# Patient Record
Sex: MALE | Race: WHITE | NOT HISPANIC OR LATINO | Employment: FULL TIME | URBAN - METROPOLITAN AREA
[De-identification: names, ages, dates, MRNs, and addresses within clinical notes are randomized per-mention and may not be internally consistent; named-entity substitution may affect disease eponyms.]

---

## 2020-07-01 PROBLEM — K42.9 UMBILICAL HERNIA WITHOUT OBSTRUCTION AND WITHOUT GANGRENE: Status: ACTIVE | Noted: 2020-07-01

## 2020-12-08 PROBLEM — E78.2 MIXED HYPERLIPIDEMIA: Status: ACTIVE | Noted: 2020-12-08

## 2020-12-08 PROBLEM — E11.65 TYPE 2 DIABETES MELLITUS WITH HYPERGLYCEMIA, WITHOUT LONG-TERM CURRENT USE OF INSULIN: Status: ACTIVE | Noted: 2020-12-08

## 2021-03-31 ENCOUNTER — IMMUNIZATION (OUTPATIENT)
Dept: PRIMARY CARE CLINIC | Facility: CLINIC | Age: 41
End: 2021-03-31

## 2021-03-31 DIAGNOSIS — Z23 NEED FOR VACCINATION: Primary | ICD-10-CM

## 2021-03-31 PROCEDURE — 91303 PR SARSCOV2 VAC AD26 .5ML IM: CPT | Mod: S$GLB,,, | Performed by: INTERNAL MEDICINE

## 2021-03-31 PROCEDURE — 0031A PR IMMUNIZ ADMIN, SARS-COV-2 COVID-19 VACC, 5X10VP/0.5ML: ICD-10-PCS | Mod: CV19,S$GLB,, | Performed by: INTERNAL MEDICINE

## 2021-03-31 PROCEDURE — 0031A PR IMMUNIZ ADMIN, SARS-COV-2 COVID-19 VACC, 5X10VP/0.5ML: CPT | Mod: CV19,S$GLB,, | Performed by: INTERNAL MEDICINE

## 2021-03-31 PROCEDURE — 91303 PR SARSCOV2 VAC AD26 .5ML IM: ICD-10-PCS | Mod: S$GLB,,, | Performed by: INTERNAL MEDICINE

## 2022-08-22 PROBLEM — J30.89 ENVIRONMENTAL AND SEASONAL ALLERGIES: Status: ACTIVE | Noted: 2022-08-22

## 2023-03-16 PROBLEM — E66.01 MORBID OBESITY: Status: ACTIVE | Noted: 2023-03-16

## 2023-04-20 ENCOUNTER — PATIENT MESSAGE (OUTPATIENT)
Dept: UROLOGY | Facility: CLINIC | Age: 43
End: 2023-04-20
Payer: COMMERCIAL

## 2023-11-08 ENCOUNTER — TELEPHONE (OUTPATIENT)
Dept: OTOLARYNGOLOGY | Facility: CLINIC | Age: 43
End: 2023-11-08
Payer: COMMERCIAL

## 2023-11-08 NOTE — TELEPHONE ENCOUNTER
----- Message from Zac Montes sent at 11/8/2023  3:07 PM CST -----  Contact: self  Type: Sooner Appointment Request        Caller is requesting a sooner appointment. Caller declined first available appointment listed below. Caller will not accept being placed on the waitlist and is requesting a message be sent to doctor.        Name of Caller: Patient   Best Call Back Number: [phone]  Additional Information: Type 2 diabetes mellitus with hyperglycemia, without long-term current use of insulin [E11.65]  Right thyroid nodule. Plz call pt to get scheduled sooner than January. Pt has a referral int he system as well. Thanks

## 2023-11-15 ENCOUNTER — OFFICE VISIT (OUTPATIENT)
Dept: OTOLARYNGOLOGY | Facility: CLINIC | Age: 43
End: 2023-11-15
Payer: COMMERCIAL

## 2023-11-15 VITALS — WEIGHT: 281.06 LBS | BODY MASS INDEX: 40.91 KG/M2

## 2023-11-15 DIAGNOSIS — E04.1 RIGHT THYROID NODULE: Primary | ICD-10-CM

## 2023-11-15 DIAGNOSIS — E11.65 TYPE 2 DIABETES MELLITUS WITH HYPERGLYCEMIA, WITHOUT LONG-TERM CURRENT USE OF INSULIN: ICD-10-CM

## 2023-11-15 PROCEDURE — 1159F MED LIST DOCD IN RCRD: CPT | Mod: CPTII,S$GLB,, | Performed by: OTOLARYNGOLOGY

## 2023-11-15 PROCEDURE — 3008F BODY MASS INDEX DOCD: CPT | Mod: CPTII,S$GLB,, | Performed by: OTOLARYNGOLOGY

## 2023-11-15 PROCEDURE — 3066F NEPHROPATHY DOC TX: CPT | Mod: CPTII,S$GLB,, | Performed by: OTOLARYNGOLOGY

## 2023-11-15 PROCEDURE — 99999 PR PBB SHADOW E&M-EST. PATIENT-LVL V: CPT | Mod: PBBFAC,,, | Performed by: OTOLARYNGOLOGY

## 2023-11-15 PROCEDURE — 3008F PR BODY MASS INDEX (BMI) DOCUMENTED: ICD-10-PCS | Mod: CPTII,S$GLB,, | Performed by: OTOLARYNGOLOGY

## 2023-11-15 PROCEDURE — 1160F RVW MEDS BY RX/DR IN RCRD: CPT | Mod: CPTII,S$GLB,, | Performed by: OTOLARYNGOLOGY

## 2023-11-15 PROCEDURE — 99204 PR OFFICE/OUTPT VISIT, NEW, LEVL IV, 45-59 MIN: ICD-10-PCS | Mod: S$GLB,,, | Performed by: OTOLARYNGOLOGY

## 2023-11-15 PROCEDURE — 1159F PR MEDICATION LIST DOCUMENTED IN MEDICAL RECORD: ICD-10-PCS | Mod: CPTII,S$GLB,, | Performed by: OTOLARYNGOLOGY

## 2023-11-15 PROCEDURE — 3052F PR MOST RECENT HEMOGLOBIN A1C LEVEL 8.0 - < 9.0%: ICD-10-PCS | Mod: CPTII,S$GLB,, | Performed by: OTOLARYNGOLOGY

## 2023-11-15 PROCEDURE — 99204 OFFICE O/P NEW MOD 45 MIN: CPT | Mod: S$GLB,,, | Performed by: OTOLARYNGOLOGY

## 2023-11-15 PROCEDURE — 3061F NEG MICROALBUMINURIA REV: CPT | Mod: CPTII,S$GLB,, | Performed by: OTOLARYNGOLOGY

## 2023-11-15 PROCEDURE — 1160F PR REVIEW ALL MEDS BY PRESCRIBER/CLIN PHARMACIST DOCUMENTED: ICD-10-PCS | Mod: CPTII,S$GLB,, | Performed by: OTOLARYNGOLOGY

## 2023-11-15 PROCEDURE — 99999 PR PBB SHADOW E&M-EST. PATIENT-LVL V: ICD-10-PCS | Mod: PBBFAC,,, | Performed by: OTOLARYNGOLOGY

## 2023-11-15 PROCEDURE — 3052F HG A1C>EQUAL 8.0%<EQUAL 9.0%: CPT | Mod: CPTII,S$GLB,, | Performed by: OTOLARYNGOLOGY

## 2023-11-15 PROCEDURE — 3066F PR DOCUMENTATION OF TREATMENT FOR NEPHROPATHY: ICD-10-PCS | Mod: CPTII,S$GLB,, | Performed by: OTOLARYNGOLOGY

## 2023-11-15 PROCEDURE — 3061F PR NEG MICROALBUMINURIA RESULT DOCUMENTED/REVIEW: ICD-10-PCS | Mod: CPTII,S$GLB,, | Performed by: OTOLARYNGOLOGY

## 2023-11-15 NOTE — PATIENT INSTRUCTIONS
FINE NEEDLE ASPIRATION OF THYROID NODULES  (Information adapted from the [employer] Association, 2019)  www.thyroid.org    WHAT IS THE THYROID GLAND?  The thyroid gland is a butterfly shaped endocrine gland that is normally located in the lower front of the neck.  The thyroid job is to make thyroid hormones, which are secreted into the blood and then carried to every tissue in the body.  Thyroid hormones help the body use energy, stay warm and keep the brain, heart, muscles and other organs working as they should.    WHAT IS A FINE NEEDLE ASPIRATION BIOPSY?  A fine-needle aspiration biopsy is a simple and safe outpatient procedure performed by a physician.  The biopsy is typically performed under ultrasound guidance to ensure accurate placement of the needle within the nodule.  During the procedure, you will be asked to lie down on your back with your head tipped backwards, so that your neck is extended.  A small amount of numbing medicine is placed on the surface of the skin, and a small sample is taken from the nodule in question.  During the procedure you may feel some neck pressure from the ultrasound probe and from the needle.  It is important to remain still during the procedure and avoid coughing, talking, and swallowing.    WHAT SHOULD YOU EXPECT FOLLOWING THE PROCEDURE?  After the procedure, you may be asked to sit up slowly to prevent from getting lightheaded.  Most patients typically leaves feeling well.  There are very few, if any restrictions on what you can do after a thyroid biopsy.  Mild neck discomfort at the site of the biopsy is expected following the procedure. over-the-counter pain medication and ice compresses can be used to relieve discomfort.    HOW LONG DOES NOT TAKE FOR RESULTS TO RETURN?  Biopsy results can take anywhere from a few days to 2 weeks.. Your doctor will reach out with the results when they become available.    WHAT OTHER POSSIBLE RESULTS?  Results of thyroid biopsies are  given as 1 of 6 possible diagnoses, according to the bed status system for reporting thyroid cytopathology.  Please note that the percentage is below may be somewhat different depending on the institution or center.    BENIGN:  This accounts for up to 70% of biopsies using this classification.  The risk of cancer or malignancy in this group is less than 3%.  These nodules are generally monitored with a follow-up ultrasound and if needed, periodically after that.  MALIGNANT (CANCER):  This accounts for 3-7% of all biopsies using this classification.  When a biopsy comes back as malignant, there is a 97-99% chance that it is truly cancerous.  Almost all of these nodules will go to surgery.  SUSPICIOUS FOR MALIGNANCY:  When a biopsy result returns as suspicious for malignancy, there is a 60-75% chance of cancer.  The cytopathologist will see features that are worrisome, but not diagnostic of cancer.  The treatment is typically surgery.  ATYPIA OF UNDETERMINED SIGNIFICANCE (AUS) OR FOLLICULAR LESION OF UNDETERMINED SIGNIFICANCE (FLUS):  Pre this category may alternatively be called indeterminate.  The specimens had some features that are worrisome and some features that look more benign.  This diagnosis carries a 5-15% chance of malignancy, although there is some variability among institutions.  A repeat biopsy, genetic testing, or repeating an ultrasound may be useful in these cases.  FOLLICULAR NEOPLASM OR SUSPICIOUS FOR FOLLICULAR NEOPLASM:  This category alternatively may be called indeterminate as well.  This category caries of 15-30% risk of malignancy.  It is difficult to tell if nodules are benign or malignant on less taken out.  Genetic testing may be considered in these cases.  When surgery, removing half of the thyroid is sometimes an option.  NON-DIAGNOSTIC:  This means that there are not enough cells in the sample to make a diagnosis.  Despite our best efforts and even when we can see the needle was in the  nodule during the biopsy, the specimen and sometimes does not have enough thyroid follicular cells to make a proper diagnosis.  Nondiagnostic sample scan also a car when only cyst fluid is taken out, and for other reasons, such as the presence of too much blood.  In these cases, the biopsy can be repeated, and if nondiagnostic a 2nd time, consideration is given to a 3rd biopsy, monitoring, or surgery.    WHAT GENETIC TESTS ARE USEFUL FOR THYROID BIOPSIES OF NODULES?  Within the past few years, several molecular tests have become available to help determine whether some nodules are cancerous or benign.  These tests look at many genes within the thyroid nodules genetic information.  They are being used when a nodule biopsy comes back with a diagnosis of indeterminate.  Sometimes the person doing the biopsy will perform an additional pass of the needle to obtain material for such a test.  This may be done on the 1st biopsy or at the time of a repeat biopsy.

## 2023-11-15 NOTE — PROGRESS NOTES
Subjective:       Patient ID: Aaron Carpenter is a 43 y.o. male.    Chief Complaint: Thyroid Problem (Nodules )    Aaron is here for Thyroid nodule. Initially diagnosed: incidentally during ultrasound obtained following palpation of an acute submental mass (over thyroid cartilage.) The submental mass has since improved a lot and nothing was seen on ultrasound.  Characteristics of symptoms: no pain, no dysphagia, no odynophagia, no compressive symptoms, no voice changes  no previous biopsy  Previous treatments: none    no personal history of radiation  no family history of thyroid cancer  History of a right branchial cleft cyst around 10 yrs ago - Dr Jain    no history of kidney stones  no history of osteopenia / porosis  no memory issues  no GI issues (nausea / bowel changes)       Social History     Tobacco Use   Smoking Status Former    Current packs/day: 0.00    Types: Cigarettes    Start date: 2001    Quit date: 2016    Years since quittin.8   Smokeless Tobacco Never     Social History     Substance and Sexual Activity   Alcohol Use Yes    Comment: social        Objective:        Constitutional:   He is oriented to person, place, and time. He appears well-developed and well-nourished. He appears alert. He is active. Normal speech.      Head:  Normocephalic and atraumatic. Head is without TMJ tenderness. No scars. Salivary glands normal.  Facial strength is normal.      Ears:    Right Ear: No drainage or swelling. No middle ear effusion.   Left Ear: No drainage or swelling.  No middle ear effusion.     Nose:  No mucosal edema, rhinorrhea or sinus tenderness. No turbinate hypertrophy.      Mouth/Throat  Oropharynx clear and moist without lesions or asymmetry, normal uvula midline and mirror exam normal. Normal dentition. No uvula swelling, lacerations or trismus. No oropharyngeal exudate. Tonsillar erythema, tonsillar exudate.      Neck:  Full range of motion with neck supple and no adenopathy.  Thyroid tenderness is present. No tracheal deviation, no edema, no erythema, normal range of motion, no stridor, no crepitus and no neck rigidity present. No thyroid mass present.   Well healed R high neck incision  No obvious mass in submental / prethyroidal tissue - mildly prominent adipose tissue.  No palpable thyroid nodule     Cardiovascular:    Intact distal pulses and normal pulses.              Pulmonary/Chest:   Effort normal and breath sounds normal. No stridor.     Psychiatric:   His speech is normal and behavior is normal. His mood appears not anxious. His affect is not labile.     Neurological:   He is alert and oriented to person, place, and time. No sensory deficit.     Skin:   No abrasions, lacerations, lesions, or rashes. No abrasion and no bruising noted.         Tests / Results:  I personally reviewed the following imaging studies and my impression is:  11/8/2023 neck ultrasound:   Vague slightly hypoechoic nodule posterior to right pole (one image appears distinct from gland). Borders are regular on some viewed images and a little blurred on others. No internal calcifications. No CINE images    FINDINGS:  The right lobe of the thyroid measures 5.1 x 2.2 x 2.1 cm.  The left lobe measures 3.9 x 1.5 x 1.5 cm.  The thyroid isthmus measures 4 mm in thickness.  There is an isoechoic 2.1 x 1.2 x 1.4 cm nodule in the posterior aspect of the right lobe of the thyroid gland.  There is no concerning artifact.  There is no abnormal vascularity.     Evaluation of the submental soft tissues corresponding to the palpable abnormality demonstrates normal subcutaneous fat without nodule or mass.     Impression:     1. No finding corresponding to the palpable abnormality in the submental region.  CT may be beneficial.  2. Right thyroid nodule as detailed above.  This meets criteria for percutaneous sampling.  TI-RADS category     TSH: normal  Ca 8.7 - 9.4    Assessment:       1. Right thyroid nodule    2. Type 2  diabetes mellitus with hyperglycemia, without long-term current use of insulin          Plan:         We discussed thyroid nodule  Right nodule (possible exophytic nodule) meets criteria for FNA  Will call with results

## 2023-11-16 ENCOUNTER — TELEPHONE (OUTPATIENT)
Dept: ENDOCRINOLOGY | Facility: CLINIC | Age: 43
End: 2023-11-16
Payer: COMMERCIAL

## 2023-11-16 NOTE — TELEPHONE ENCOUNTER
Called pt upon receiving referral for thyroid nodule. Pt said they would call back when they are ready to schedule.

## 2023-11-17 ENCOUNTER — TELEPHONE (OUTPATIENT)
Dept: RADIOLOGY | Facility: HOSPITAL | Age: 43
End: 2023-11-17
Payer: COMMERCIAL

## 2023-11-22 ENCOUNTER — PATIENT MESSAGE (OUTPATIENT)
Dept: RADIOLOGY | Facility: HOSPITAL | Age: 43
End: 2023-11-22
Payer: COMMERCIAL

## 2023-11-22 ENCOUNTER — TELEPHONE (OUTPATIENT)
Dept: RADIOLOGY | Facility: HOSPITAL | Age: 43
End: 2023-11-22
Payer: COMMERCIAL

## 2023-11-22 NOTE — TELEPHONE ENCOUNTER
Pt called/ voicemail left 11/17 and 11/20/23 to schedule thyroid fna. Pt messaged thru portal 11/22/23.

## 2023-12-20 ENCOUNTER — TELEPHONE (OUTPATIENT)
Dept: OTOLARYNGOLOGY | Facility: CLINIC | Age: 43
End: 2023-12-20
Payer: COMMERCIAL

## 2023-12-20 NOTE — TELEPHONE ENCOUNTER
Multiple attempts to schedule patient for FNA.  No return call from patient please advise. I spoke with Marnie the wife an she would like to be called to schedule.  Radiology department notified

## 2024-01-11 ENCOUNTER — TELEPHONE (OUTPATIENT)
Dept: OTOLARYNGOLOGY | Facility: CLINIC | Age: 44
End: 2024-01-11
Payer: COMMERCIAL

## 2024-01-18 ENCOUNTER — HOSPITAL ENCOUNTER (OUTPATIENT)
Dept: RADIOLOGY | Facility: HOSPITAL | Age: 44
Discharge: HOME | End: 2024-01-18
Attending: OTOLARYNGOLOGY
Payer: COMMERCIAL

## 2024-01-18 DIAGNOSIS — E04.1 RIGHT THYROID NODULE: ICD-10-CM

## 2024-01-18 PROCEDURE — 88173 CYTOPATH EVAL FNA REPORT: CPT | Mod: 26,,, | Performed by: PATHOLOGY

## 2024-01-18 PROCEDURE — 10005 FNA BX W/US GDN 1ST LES: CPT | Mod: PO

## 2024-01-18 PROCEDURE — 88173 CYTOPATH EVAL FNA REPORT: CPT | Mod: PO | Performed by: PATHOLOGY

## 2024-01-18 PROCEDURE — 10005 FNA BX W/US GDN 1ST LES: CPT | Mod: ,,, | Performed by: RADIOLOGY

## 2024-01-18 PROCEDURE — 25000003 PHARM REV CODE 250: Mod: PO | Performed by: OTOLARYNGOLOGY

## 2024-01-18 RX ORDER — LIDOCAINE HYDROCHLORIDE 10 MG/ML
5 INJECTION INFILTRATION; PERINEURAL ONCE
Status: COMPLETED | OUTPATIENT
Start: 2024-01-18 | End: 2024-01-18

## 2024-01-18 RX ADMIN — LIDOCAINE HYDROCHLORIDE 5 ML: 10 INJECTION, SOLUTION INFILTRATION; PERINEURAL at 08:01

## 2024-01-19 LAB
FINAL PATHOLOGIC DIAGNOSIS: ABNORMAL
Lab: ABNORMAL

## 2024-01-22 ENCOUNTER — TELEPHONE (OUTPATIENT)
Dept: OTOLARYNGOLOGY | Facility: CLINIC | Age: 44
End: 2024-01-22
Payer: COMMERCIAL

## 2024-01-22 DIAGNOSIS — E04.1 RIGHT THYROID NODULE: Primary | ICD-10-CM

## 2024-01-22 NOTE — TELEPHONE ENCOUNTER
----- Message from José Luis Lee MD sent at 1/21/2024  7:21 PM CST -----  Aaron,  The biopsy results indicate about a 5-15% chance of the nodule being cancerous. This is higher than the usual risk of 5% but also not tremendously high. Options include continue observation with repeat ultrasound in 6 months or we could consider removal (half of thyroid.) Because it was found by accident and there are no overtly concerning signs, observation is reasonable.    Let me know if you have any questions or have a decision.    José Luis Lee MD  Otolaryngology - Head and Neck Surgery  Office: [phone]  Cell: [phone]  Fax: [phone]    This message was generated using voice dictation. Please excuse any errors that may have been created by the transcription software.

## 2024-01-22 NOTE — PROGRESS NOTES
Aaron,  The biopsy results indicate about a 5-15% chance of the nodule being cancerous. This is higher than the usual risk of 5% but also not tremendously high. Options include continue observation with repeat ultrasound in 6 months or we could consider removal (half of thyroid.) Because it was found by accident and there are no overtly concerning signs, observation is reasonable.    Let me know if you have any questions or have a decision.    José Luis Lee MD  Otolaryngology - Head and Neck Surgery  Office: [phone]  Cell: [phone]  Fax: [phone]    This message was generated using voice dictation. Please excuse any errors that may have been created by the transcription software.

## 2024-12-02 ENCOUNTER — HOSPITAL ENCOUNTER (OUTPATIENT)
Dept: RADIOLOGY | Facility: HOSPITAL | Age: 44
Discharge: HOME | End: 2024-12-02
Attending: OTOLARYNGOLOGY
Payer: COMMERCIAL

## 2024-12-02 DIAGNOSIS — E04.1 RIGHT THYROID NODULE: ICD-10-CM

## 2024-12-02 PROCEDURE — 76536 US EXAM OF HEAD AND NECK: CPT | Mod: 26,,, | Performed by: RADIOLOGY

## 2024-12-02 PROCEDURE — 76536 US EXAM OF HEAD AND NECK: CPT | Mod: TC,PO

## 2025-05-28 ENCOUNTER — LAB VISIT (OUTPATIENT)
Dept: LAB | Facility: HOSPITAL | Age: 45
End: 2025-05-28
Payer: COMMERCIAL

## 2025-05-28 DIAGNOSIS — E78.2 MIXED HYPERLIPIDEMIA: ICD-10-CM

## 2025-05-28 DIAGNOSIS — E11.65 TYPE 2 DIABETES MELLITUS WITH HYPERGLYCEMIA, WITHOUT LONG-TERM CURRENT USE OF INSULIN: ICD-10-CM

## 2025-05-28 LAB
ALBUMIN SERPL BCP-MCNC: 4.1 G/DL (ref 3.5–5.2)
ALBUMIN/CREAT UR: 24.7 UG/MG
ALP SERPL-CCNC: 98 UNIT/L (ref 40–150)
ALT SERPL W/O P-5'-P-CCNC: 24 UNIT/L (ref 10–44)
ANION GAP (OHS): 12 MMOL/L (ref 8–16)
AST SERPL-CCNC: 22 UNIT/L (ref 11–45)
BILIRUB SERPL-MCNC: 0.8 MG/DL (ref 0.1–1)
BUN SERPL-MCNC: 13 MG/DL (ref 6–20)
CALCIUM SERPL-MCNC: 9.3 MG/DL (ref 8.7–10.5)
CHLORIDE SERPL-SCNC: 103 MMOL/L (ref 95–110)
CHOLEST SERPL-MCNC: 135 MG/DL (ref 120–199)
CHOLEST/HDLC SERPL: 4.2 {RATIO} (ref 2–5)
CK SERPL-CCNC: 75 U/L (ref 20–200)
CO2 SERPL-SCNC: 23 MMOL/L (ref 23–29)
CREAT SERPL-MCNC: 0.7 MG/DL (ref 0.5–1.4)
CREAT UR-MCNC: 97 MG/DL (ref 23–375)
EAG (OHS): 206 MG/DL (ref 68–131)
GFR SERPLBLD CREATININE-BSD FMLA CKD-EPI: >60 ML/MIN/1.73/M2
GLUCOSE SERPL-MCNC: 226 MG/DL (ref 70–110)
HBA1C MFR BLD: 8.8 % (ref 4–5.6)
HDLC SERPL-MCNC: 32 MG/DL (ref 40–75)
HDLC SERPL: 23.7 % (ref 20–50)
LDLC SERPL CALC-MCNC: 42.6 MG/DL (ref 63–159)
MICROALBUMIN UR-MCNC: 24 UG/ML (ref ?–5000)
NONHDLC SERPL-MCNC: 103 MG/DL
POTASSIUM SERPL-SCNC: 4.8 MMOL/L (ref 3.5–5.1)
PROT SERPL-MCNC: 7.9 GM/DL (ref 6–8.4)
SODIUM SERPL-SCNC: 138 MMOL/L (ref 136–145)
TRIGL SERPL-MCNC: 302 MG/DL (ref 30–150)

## 2025-05-28 PROCEDURE — 83718 ASSAY OF LIPOPROTEIN: CPT

## 2025-05-28 PROCEDURE — 82043 UR ALBUMIN QUANTITATIVE: CPT

## 2025-05-28 PROCEDURE — 82550 ASSAY OF CK (CPK): CPT

## 2025-05-28 PROCEDURE — 83036 HEMOGLOBIN GLYCOSYLATED A1C: CPT

## 2025-05-28 PROCEDURE — 36415 COLL VENOUS BLD VENIPUNCTURE: CPT | Mod: PO

## 2025-05-28 PROCEDURE — 82040 ASSAY OF SERUM ALBUMIN: CPT

## 2025-08-21 ENCOUNTER — LAB VISIT (OUTPATIENT)
Dept: LAB | Facility: HOSPITAL | Age: 45
End: 2025-08-21
Attending: INTERNAL MEDICINE
Payer: COMMERCIAL

## 2025-08-21 DIAGNOSIS — E11.65 TYPE 2 DIABETES MELLITUS WITH HYPERGLYCEMIA, WITHOUT LONG-TERM CURRENT USE OF INSULIN: ICD-10-CM

## 2025-08-21 LAB
ANION GAP (OHS): 9 MMOL/L (ref 8–16)
BUN SERPL-MCNC: 10 MG/DL (ref 6–20)
CALCIUM SERPL-MCNC: 9.5 MG/DL (ref 8.7–10.5)
CHLORIDE SERPL-SCNC: 103 MMOL/L (ref 95–110)
CO2 SERPL-SCNC: 25 MMOL/L (ref 23–29)
CREAT SERPL-MCNC: 0.8 MG/DL (ref 0.5–1.4)
EAG (OHS): 194 MG/DL (ref 68–131)
GFR SERPLBLD CREATININE-BSD FMLA CKD-EPI: >60 ML/MIN/1.73/M2
GLUCOSE SERPL-MCNC: 217 MG/DL (ref 70–110)
HBA1C MFR BLD: 8.4 % (ref 4–5.6)
POTASSIUM SERPL-SCNC: 4.4 MMOL/L (ref 3.5–5.1)
SODIUM SERPL-SCNC: 137 MMOL/L (ref 136–145)

## 2025-08-21 PROCEDURE — 36415 COLL VENOUS BLD VENIPUNCTURE: CPT | Mod: PO

## 2025-08-21 PROCEDURE — 83036 HEMOGLOBIN GLYCOSYLATED A1C: CPT

## 2025-08-21 PROCEDURE — 80048 BASIC METABOLIC PNL TOTAL CA: CPT
